# Patient Record
Sex: MALE | Race: WHITE | NOT HISPANIC OR LATINO | Employment: UNEMPLOYED | URBAN - METROPOLITAN AREA
[De-identification: names, ages, dates, MRNs, and addresses within clinical notes are randomized per-mention and may not be internally consistent; named-entity substitution may affect disease eponyms.]

---

## 2024-10-17 ENCOUNTER — HOSPITAL ENCOUNTER (EMERGENCY)
Facility: HOSPITAL | Age: 39
Discharge: HOME | End: 2024-10-17
Attending: EMERGENCY MEDICINE
Payer: MEDICAID

## 2024-10-17 VITALS
TEMPERATURE: 97.8 F | HEART RATE: 87 BPM | HEIGHT: 68 IN | WEIGHT: 244.05 LBS | DIASTOLIC BLOOD PRESSURE: 73 MMHG | RESPIRATION RATE: 16 BRPM | BODY MASS INDEX: 36.99 KG/M2 | SYSTOLIC BLOOD PRESSURE: 125 MMHG | OXYGEN SATURATION: 97 %

## 2024-10-17 DIAGNOSIS — S61.011A LACERATION OF RIGHT THUMB WITHOUT FOREIGN BODY WITHOUT DAMAGE TO NAIL, INITIAL ENCOUNTER: Primary | ICD-10-CM

## 2024-10-17 PROCEDURE — 25010000002 TETANUS-DIPHTH-ACELL PERTUSSIS 5-2.5-18.5 LF-MCG/0.5 SUSPENSION PREFILLED SYRINGE

## 2024-10-17 PROCEDURE — 90715 TDAP VACCINE 7 YRS/> IM: CPT

## 2024-10-17 PROCEDURE — 90471 IMMUNIZATION ADMIN: CPT

## 2024-10-17 PROCEDURE — 90472 IMMUNIZATION ADMIN EACH ADD: CPT

## 2024-10-17 PROCEDURE — 99283 EMERGENCY DEPT VISIT LOW MDM: CPT

## 2024-10-17 RX ORDER — CEPHALEXIN 500 MG/1
500 CAPSULE ORAL 2 TIMES DAILY
Qty: 10 CAPSULE | Refills: 0 | Status: SHIPPED | OUTPATIENT
Start: 2024-10-17 | End: 2024-10-22

## 2024-10-17 RX ADMIN — TETANUS TOXOID, REDUCED DIPHTHERIA TOXOID AND ACELLULAR PERTUSSIS VACCINE, ADSORBED 0.5 ML: 5; 2.5; 8; 8; 2.5 SUSPENSION INTRAMUSCULAR at 06:32

## 2024-10-17 NOTE — ED PROVIDER NOTES
Time: 6:18 AM EDT  Date of encounter:  10/17/2024  Independent Historian/Clinical History and Information was obtained by:   Patient    History is limited by: N/A    Chief Complaint: Right thumb laceration x 1 day      History of Present Illness:  Patient is a 39 y.o. year old male who presents to the emergency department for evaluation of a right thumb laceration x 1 day.  Patient reports that injury occurred approximately 45 minutes ago.  Reports that he was using a [place] coffee press when he accidentally sliced his thumb on a metal portion of the device.  Patient reports no foreign body sensation at time of interview.  Patient reports unknown tetanus vaccination status.  Patient denies any known allergies      Patient Care Team  Primary Care Provider: Provider, No Known    Past Medical History:     Allergies   Allergen Reactions    Erythromycin Rash    Motrin [Ibuprofen] Rash     Past Medical History:   Diagnosis Date    Schizophrenia      No past surgical history on file.  No family history on file.    Home Medications:  Prior to Admission medications    Not on File        Social History:   Social History     Tobacco Use    Smoking status: Every Day     Types: Cigarettes    Smokeless tobacco: Current     Types: Snuff   Vaping Use    Vaping status: Former   Substance Use Topics    Alcohol use: Yes     Comment: rare         Review of Systems:  Review of Systems   Constitutional:  Negative for chills and fever.   HENT:  Negative for congestion, rhinorrhea and sore throat.    Eyes:  Negative for pain and visual disturbance.   Respiratory:  Negative for apnea, cough, chest tightness and shortness of breath.    Cardiovascular:  Negative for chest pain and palpitations.   Gastrointestinal:  Negative for abdominal pain, diarrhea, nausea and vomiting.   Genitourinary:  Negative for difficulty urinating and dysuria.   Musculoskeletal:  Negative for joint swelling and myalgias.   Skin:  Positive for wound. Negative for  "color change.   Neurological:  Negative for seizures and headaches.   Psychiatric/Behavioral: Negative.     All other systems reviewed and are negative.       Physical Exam:  /73 (BP Location: Left arm, Patient Position: Sitting)   Pulse 87   Temp 97.8 °F (36.6 °C) (Oral)   Resp 16   Ht 172.7 cm (68\")   Wt 111 kg (244 lb 0.8 oz)   SpO2 97%   BMI 37.11 kg/m²     Physical Exam  Vitals and nursing note reviewed.   Constitutional:       General: He is not in acute distress.     Appearance: Normal appearance. He is not toxic-appearing.   HENT:      Head: Normocephalic and atraumatic.      Jaw: There is normal jaw occlusion.   Eyes:      General: Lids are normal.      Extraocular Movements: Extraocular movements intact.      Conjunctiva/sclera: Conjunctivae normal.      Pupils: Pupils are equal, round, and reactive to light.   Cardiovascular:      Rate and Rhythm: Normal rate and regular rhythm.      Pulses: Normal pulses.      Heart sounds: Normal heart sounds.   Pulmonary:      Effort: Pulmonary effort is normal. No respiratory distress.      Breath sounds: Normal breath sounds. No wheezing or rhonchi.   Abdominal:      General: Abdomen is flat.      Palpations: Abdomen is soft.      Tenderness: There is no abdominal tenderness. There is no guarding or rebound.   Musculoskeletal:         General: Normal range of motion.      Right hand: Laceration present.        Hands:       Cervical back: Normal range of motion and neck supple.      Right lower leg: No edema.      Left lower leg: No edema.   Skin:     General: Skin is warm and dry.   Neurological:      Mental Status: He is alert and oriented to person, place, and time. Mental status is at baseline.   Psychiatric:         Mood and Affect: Mood normal.                  Procedures:  Laceration Repair    Date/Time: 10/17/2024 6:20 AM    Performed by: Mani Ramos PA-C  Authorized by: Angelina Chandler MD    Consent:     Consent obtained:  Verbal    " Consent given by:  Patient    Risks, benefits, and alternatives were discussed: yes      Risks discussed:  Infection, pain, poor cosmetic result and poor wound healing  Universal protocol:     Procedure explained and questions answered to patient or proxy's satisfaction: yes      Patient identity confirmed:  Verbally with patient  Pre-procedure details:     Preparation:  Patient was prepped and draped in usual sterile fashion  Exploration:     Limited defect created (wound extended): no    Treatment:     Area cleansed with:  Povidone-iodine and saline    Amount of cleaning:  Standard    Irrigation solution:  Sterile saline    Irrigation method:  Syringe    Debridement:  None    Undermining:  None    Scar revision: no    Skin repair:     Repair method:  Sutures    Suture size:  5-0    Suture material:  Nylon    Suture technique:  Simple interrupted  Approximation:     Approximation:  Close  Repair type:     Repair type:  Simple  Post-procedure details:     Dressing:  Open (no dressing)    Procedure completion:  Tolerated well, no immediate complications        Medical Decision Making:      Comorbidities that affect care:    None    External Notes reviewed:    Previous Clinic Note: Urgent care visit on 4/16/2024 reviewed.  Patient was seen for a chronic pruritic rash.      The following orders were placed and all results were independently analyzed by me:  Orders Placed This Encounter   Procedures    Laceration Repair       Medications Given in the Emergency Department:  Medications   Tetanus-Diphth-Acell Pertussis (BOOSTRIX) injection 0.5 mL (has no administration in time range)        ED Course:         Labs:    Lab Results (last 24 hours)       ** No results found for the last 24 hours. **             Imaging:    No Radiology Exams Resulted Within Past 24 Hours      Differential Diagnosis and Discussion:    Wound Evaluation: Differential diagnosis includes but is not limited to laceration, abrasion, puncture, burn,  ulcer, cellulitis, abscess, vasculitis, malignancy, and rash.        MDM  Number of Diagnoses or Management Options  Diagnosis management comments: The patient presented with a laceration in need of repair. See laceration repair note for details. The wound was irrigated with copious normal saline irrigation. [1]sutures were used to approximate the wound edges. Tetanus [1]given. The patient tolerated the procedure well. Acute bleeding has ceased and the wound was approximated in the emergency department. Patient was counseled to keep the wound clean, dry, and out of the sun. Patient was counseled to change dressings daily. Patient was advised to return to the ED for worsening erythema, pain, swelling, fever, excessive drainage or signs of infection. They were counseled to follow up for suture removal as described in the discharge instructions. Patient verbalizes understanding and agrees to follow up as instructed.                       Patient Care Considerations:    SEPSIS was considered but is NOT present in the emergency department as SIRS criteria is not present. STEROIDS: I considered prescribing steroids, however I did not as this may adversely affect the patient's blood sugar in the setting of diabetes.  I considered getting a right hand x-ray, however, nailbed was intact without evidence of wound from laceration.  Laceration depth was approximately 1 mm.      Consultants/Shared Management Plan:    None    Social Determinants of Health:    Patient is independent, reliable, and has access to care.       Disposition and Care Coordination:    Discharged: I considered escalation of care by admitting this patient to the hospital, however patient does not meet sepsis/SIRS criteria.    I have explained the patient´s condition, diagnoses and treatment plan based on the information available to me at this time. I have answered questions and addressed any concerns. The patient has a good  understanding of the patient´s  diagnosis, condition, and treatment plan as can be expected at this point. The vital signs have been stable. The patient´s condition is stable and appropriate for discharge from the emergency department.      The patient will pursue further outpatient evaluation with the primary care physician or other designated or consulting physician as outlined in the discharge instructions. They are agreeable to this plan of care and follow-up instructions have been explained in detail. The patient has received these instructions in written format and has expressed an understanding of the discharge instructions. The patient is aware that any significant change in condition or worsening of symptoms should prompt an immediate return to this or the closest emergency department or call to 911.  I have explained discharge medications and the need for follow up with the patient/caretakers. This was also printed in the discharge instructions. Patient was discharged with the following medications and follow up:      Medication List      No changes were made to your prescriptions during this visit.      Provider, No Known  [facility]  [place] [address]      As needed       Final diagnoses:   Laceration of right thumb without foreign body without damage to nail, initial encounter        ED Disposition       ED Disposition   Discharge    Condition   Stable    Comment   --               This medical record created using voice recognition software.             Mani Ramos PA-C  10/17/24 0650       --               This medical record created using voice recognition software.             Mani Ramos PA-C  10/17/24 0625       Mani Ramos PA-C  10/24/24 0046

## 2024-10-17 NOTE — ED TRIAGE NOTES
Pt to ED from home per [facility] with finger laceration.      Per EMS, pt cut right first finger on [place] press .      Bleeding controlled, pt unsure last time he had tetanus shot.